# Patient Record
Sex: FEMALE
[De-identification: names, ages, dates, MRNs, and addresses within clinical notes are randomized per-mention and may not be internally consistent; named-entity substitution may affect disease eponyms.]

---
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
## 2023-01-05 LAB
BARLEY IGE QN: <0.1 KUA/L
CELIAC DISEASE INTERPRETATION: NORMAL
CELIAC GENE PAIRS PRESENT: NO
CHERRY IGE QN: <0.1 KUA/L
COW MILK IGE QN: <0.1 KUA/L
CRAB IGE QN: 0.14 KUA/L
DEPRECATED BARLEY IGE RAST QL: 0
DEPRECATED CHERRY IGE RAST QL: 0
DEPRECATED COW MILK IGE RAST QL: 0
DEPRECATED CRAB IGE RAST QL: NORMAL
DEPRECATED EGG WHITE IGE RAST QL: NORMAL
DEPRECATED OAT IGE RAST QL: 0
DEPRECATED PEANUT IGE RAST QL: 0
DEPRECATED RYE IGE RAST QL: 0
DEPRECATED SOYBEAN IGE RAST QL: 0
DEPRECATED WHEAT IGE RAST QL: 0
DQ ALPHA 1: NORMAL
DQ BETA 1: NORMAL
EGG WHITE IGE QN: 0.22 KUA/L
IMMUNOGLOBULIN A (IGA): 264 MG/DL
OAT IGE QN: <0.1 KUA/L
PEANUT IGE QN: <0.1 KUA/L
RYE IGE QN: <0.1 KUA/L
SOYBEAN IGE QN: <0.1 KUA/L
TOTAL IGE SMQN RAST: 84 KU/L
WHEAT IGE QN: <0.1 KUA/L

## 2023-01-06 LAB
BACTERIA STL CULT: NORMAL
H PYLORI AG STL QL: NEGATIVE

## 2023-01-09 LAB
CALPROTECTIN FECAL: 171 UG/G
LACTOFERRIN STL-MCNC: 2.42 CD:794062635

## 2023-01-11 LAB — PANCREATIC ELASTASE, FECAL: 57 CD:794062645

## 2023-01-23 ENCOUNTER — APPOINTMENT (OUTPATIENT)
Dept: GASTROENTEROLOGY | Facility: CLINIC | Age: 18
End: 2023-01-23